# Patient Record
Sex: MALE | Race: WHITE | Employment: FULL TIME | URBAN - METROPOLITAN AREA
[De-identification: names, ages, dates, MRNs, and addresses within clinical notes are randomized per-mention and may not be internally consistent; named-entity substitution may affect disease eponyms.]

---

## 2020-02-22 ENCOUNTER — HOSPITAL ENCOUNTER (EMERGENCY)
Facility: CLINIC | Age: 30
Discharge: HOME | End: 2020-02-23
Attending: EMERGENCY MEDICINE | Admitting: EMERGENCY MEDICINE

## 2020-02-22 DIAGNOSIS — N39.0 URINARY TRACT INFECTION WITH HEMATURIA, SITE UNSPECIFIED: ICD-10-CM

## 2020-02-22 DIAGNOSIS — R36.9 PENILE DISCHARGE: ICD-10-CM

## 2020-02-22 DIAGNOSIS — R31.9 URINARY TRACT INFECTION WITH HEMATURIA, SITE UNSPECIFIED: ICD-10-CM

## 2020-02-22 LAB
ALBUMIN UR-MCNC: NEGATIVE MG/DL
APPEARANCE UR: CLEAR
BILIRUB UR QL STRIP: NEGATIVE
COLOR UR AUTO: YELLOW
GLUCOSE UR STRIP-MCNC: NEGATIVE MG/DL
HGB UR QL STRIP: ABNORMAL
KETONES UR STRIP-MCNC: NEGATIVE MG/DL
LEUKOCYTE ESTERASE UR QL STRIP: ABNORMAL
MUCOUS THREADS #/AREA URNS LPF: PRESENT /LPF
NITRATE UR QL: NEGATIVE
PH UR STRIP: 5.5 PH (ref 5–7)
RBC #/AREA URNS AUTO: 2 /HPF (ref 0–2)
SOURCE: ABNORMAL
SP GR UR STRIP: 1.02 (ref 1–1.03)
UROBILINOGEN UR STRIP-MCNC: NORMAL MG/DL (ref 0–2)
WBC #/AREA URNS AUTO: 22 /HPF (ref 0–5)

## 2020-02-22 PROCEDURE — 99283 EMERGENCY DEPT VISIT LOW MDM: CPT

## 2020-02-22 PROCEDURE — 87591 N.GONORRHOEAE DNA AMP PROB: CPT | Performed by: EMERGENCY MEDICINE

## 2020-02-22 PROCEDURE — 81001 URINALYSIS AUTO W/SCOPE: CPT | Performed by: EMERGENCY MEDICINE

## 2020-02-22 PROCEDURE — 87491 CHLMYD TRACH DNA AMP PROBE: CPT | Performed by: EMERGENCY MEDICINE

## 2020-02-22 RX ORDER — CEPHALEXIN 500 MG/1
500 CAPSULE ORAL 2 TIMES DAILY
Qty: 20 CAPSULE | Refills: 0 | Status: SHIPPED | OUTPATIENT
Start: 2020-02-22 | End: 2020-03-03

## 2020-02-22 ASSESSMENT — ENCOUNTER SYMPTOMS
HEMATURIA: 1
DYSURIA: 1
BACK PAIN: 1
NAUSEA: 0
FEVER: 0
VOMITING: 0

## 2020-02-22 NOTE — ED AVS SNAPSHOT
Emergency Department  [address] [place] [id number]  Phone:  [phone]  Fax:  [phone]                                    Leland Pat   MRN: [id number]    Department:   Emergency Department   Date of Visit:  2/22/2020           After Visit Summary Signature Page    I have received my discharge instructions, and my questions have been answered. I have discussed any challenges I see with this plan with the nurse or doctor.    ..........................................................................................................................................  Patient/Patient Representative Signature      ..........................................................................................................................................  Patient Representative Print Name and Relationship to Patient    ..................................................               ................................................  Date                                   Time    ..........................................................................................................................................  Reviewed by Signature/Title    ...................................................              ..............................................  Date                                               Time          22EPIC Rev 08/18

## 2020-02-23 VITALS
OXYGEN SATURATION: 99 % | DIASTOLIC BLOOD PRESSURE: 79 MMHG | RESPIRATION RATE: 18 BRPM | TEMPERATURE: 98.1 F | SYSTOLIC BLOOD PRESSURE: 131 MMHG

## 2020-02-23 NOTE — ED PROVIDER NOTES
History     Chief Complaint:  Hematuria    History limited due to language barrier. History translated via online  service.    [facility]   Leland Pat is a 29 year old male who presents to the emergency department today for evaluation of hematuria. The patient reports the development of hematuria, dysuria, and a mid low back pain this morning. He endorses a history of similar episodes x4. The patient explains that he recently started having intercourse with a new girlfriend and has been noticing a white discharge from his penis resulting in itching and a burning pain prior to intercourse for the last two weeks. The patient denies nausea, vomiting, fever, and history of STIs.     Allergies:  No Known Drug Allergies    Medications:    Medications reviewed. No current medications.     Past Medical History:    Medical history reviewed. No pertinent medical history.  Denies history of STIs    Past Surgical History:    Surgical history reviewed. No pertinent surgical history.    Family History:    Family history reviewed. No pertinent family history.     Social History:  The patient was accompanied to the ED by his friend.  Patient is Luxembourger speaking.      Review of Systems   Constitutional: Negative for fever.   Gastrointestinal: Negative for nausea and vomiting.   Genitourinary: Positive for dysuria and hematuria.   Musculoskeletal: Positive for back pain.   All other systems reviewed and are negative.    Physical Exam     Patient Vitals for the past 24 hrs:   BP Temp Temp src Heart Rate Resp SpO2   02/23/20 0012 131/79 98.1  F (36.7  C) -- -- 18 99 %   02/22/20 2225 129/83 97.6  F (36.4  C) Temporal 82 16 100 %     Physical Exam  General: Well-nourished, appears to be resting comfortably when I enter the room  Eyes: PERRL, conjunctivae pink no scleral icterus or conjunctival injection  ENT:  Moist mucus membranes, posterior oropharynx clear without erythema or exudates  Respiratory:  Lungs clear to  auscultation bilaterally, no crackles/rubs/wheezes.  Good air movement  CV: Normal rate and rhythm, no murmurs/rubs/gallops  GI:  Abdomen soft and non-distended.  Normoactive BS.  No tenderness, guarding or rebound  : Normal external exam, uncircumcised, no discharge at meatus, no candida. No sores or erosions  Skin: Warm, dry.  No rashes or petechiae  Musculoskeletal: No peripheral edema or calf tenderness  Neuro: Alert and oriented to person/place/time  Psychiatric: Normal affect      Emergency Department Course     Laboratory:  Laboratory findings were communicated with the patient who voiced understanding of the findings.    UA with Microscopic: Blood trace (A), Leukocyte Esterase moderate (A), WBC 22 (H), Mucous present (A), o/w WNL    Urine gonorrhea and chlamydia: Pending    Emergency Department Course:    2231 Nursing notes and vitals reviewed.    2249 I performed an exam of the patient as documented above.     Findings and plan explained to the patient. Patient discharged home with instructions regarding supportive care, medications, and reasons to return. The importance of close follow-up was reviewed. The patient was prescribed Keflex.    Impression & Plan      Medical Decision Making:  Leland Pat is a 29 year old male has symptoms consistent with a urinary tract infection.  Urinalysis confirms the infection.  There has been no fever, significant back/flank pain or significant abdominal pain.  There is no clinical evidence of pyelonephritis, appendicitis,  or diverticulitis.  The patient will be started on antibiotics for the infection. The patient is instructed to return if increasing pain, vomiting, fever, or inability to tolerate the oral antibiotic.  Follow up with primary physician is indicated if not improving in 2-3 days. GCC PCR is pending. The patient would like to await results prior to treatment for gonorrhea and/or chlamydia.  He is Sinhala-speaking only and will need a   for the follow-up call.  His mobile number is [phone].    Diagnosis:    ICD-10-CM    1. Urinary tract infection with hematuria, site unspecified N39.0     R31.9    2. Penile discharge R36.9      Disposition:   The patient is discharged to home.    Discharge Medications:  Discharge Medication List as of 2/23/2020 12:08 AM      START taking these medications    Details   cephALEXin (KEFLEX) 500 MG capsule Take 1 capsule (500 mg) by mouth 2 times daily for 10 days, Disp-20 capsule, R-0, Local Print           Scribe Disclosure:  Pam MCNEAL, am serving as a scribe at 10:49 PM on 2/22/2020 to document services personally performed by Dana Young MD based on my observations and the provider's statements to me.     EMERGENCY DEPARTMENT       Dana Young MD  02/23/20 0615

## 2020-02-23 NOTE — DISCHARGE INSTRUCTIONS
*You may resume diet and activities as tolerated.  *Take medications as prescribed.  Cephalexin as directed.  This will change the color of your urine to red.  Continue your current medications.  *Follow up with your doctor in the next 2-3 days for a recheck. I the gonorrhea and chlamydia test returns positive you will receive a follow-up call.  *Return if you develop fever, back pain, vomiting, unable to urinate, faint or feel like you will faint or become worse in any way.    Discharge Instructions  Urinary Tract Infection  You have urinary tract infection, or UTI. The urinary tract includes the kidneys (which make urine), ureters (the tubes that carry urine from the kidneys to the bladder), the bladder (which stores urine), and urethra (the tube that carries urine out of the bladder).  Urinary tract infections occur when bacteria travel up the urethra into the bladder. We suspect a UTI based on chemical and microscopic findings in your urine, but if there is a question about your findings, we will do a culture to see if bacteria grow. A urine culture takes several days. You should always follow-up with your primary physician to find out about results of your culture if one was done.   Return to the Emergency Department if:  You have severe back pain.  You are vomiting so that you can t take your medicine, or have signs of dehydration (such as urinating less than 3 times per day).  You have fever over 101.5 degrees F.  You have significant confusion or are very weak, or feel very ill.  Your child seems much more ill, won t wake up, won t respond right, or is crying for a long time and won t calm down.  Your child is showing signs of dehydration, Signs of dehydration can be:  Your infant has had no wet diapers in 4-5 hours.  Your older child has not passed urine in 6-8 hours.  Your infant or child starts to have dry mouth and lips, or no saliva or tears.    Follow-up with your doctor:   Children under 24 months  need to be seen by their regular doctor within one week after a diagnosis of a UTI. It may be necessary to do some imaging tests to look at the child s kidney or bladder.  You should begin to feel better within 24 - 48 hours of starting your antibiotic.  If you do not, you need to be seen again.      Treatment:   You will be treated with an antibiotic to kill the bacteria. We have to make an educated guess as to which antibiotic will work for your infection. In most healthy people, we can guess right almost all of the time. Sometimes a culture is done to show which antibiotics will work. This usually takes 2-3 days. When the culture is done, we may have to contact you to put you on a different antibiotic.  Take a pain medication such as Tylenol  (acetaminophen), Advil  (ibuprofen), Nuprin  (ibuprofen), or Aleve  (naproxen). If you have been given a narcotic such as Vicodin  (hydrocodone with acetaminophen), Percocet  (oxycodone with acetaminophen), or codeine, do not drive for four hours after you have taken it. If the narcotic contains Tylenol  (acetaminophen), do not take Tylenol  with it. All narcotics will cause constipation, so eat a high fiber diet.    Pyridium  (phenazopyridine) or Uristat  (phenazopyridine) is a prescription medication that numbs the bladder to reduce the burning pain of some UTIs.  The same medication is available in a non-prescription version called Azo-Standard  (phenazopyridine), Urodol  (phenazopyridine), or other brand names. This medication will change the color of the urine and tears (usually blue or orange). If you wear contacts, do not wear them while taking this medication as they may be stained by the medication.    Antibiotic Warning:   If you have been placed on antibiotics - watch for signs of allergic reaction.  These include rash, lip swelling, difficulty breathing, wheezing, and dizziness.  If you develop any of these symptoms, stop the antibiotic immediately and go to an  "emergency room or urgent care for evaluation.    Probiotics: If you have been given an antibiotic, you may want to also take a probiotic pill or eat yogurt with live cultures. Probiotics have \"good bacteria\" to help your intestines stay healthy. Studies have shown that probiotics help prevent diarrhea and other intestine problems (including C. diff infection) when you take antibiotics. You can buy these without a prescription in the pharmacy section of the store.   If you were given a prescription for medicine here today, be sure to read all of the information (including the package insert) that comes with your prescription.  This will include important information about the medicine, its side effects, and any warnings that you need to know about.  The pharmacist who fills the prescription can provide more information and answer questions you may have about the medicine.  If you have questions or concerns that the pharmacist cannot address, please call or return to the Emergency Department.   Opioid Medication Information    Pain medications are among the most commonly prescribed medicines, so we are including this information for all our patients. If you did not receive pain medication or get a prescription for pain medicine, you can ignore it.     You may have been given a prescription for an opioid (narcotic) pain medicine and/or have received a pain medicine while here in the Emergency Department. These medicines can make you drowsy or impaired. You must not drive, operate dangerous equipment, or engage in any other dangerous activities while taking these medications. If you drive while taking these medications, you could be arrested for DUI, or driving under the influence. Do not drink any alcohol while you are taking these medications.     Opioid pain medications can cause addiction. If you have a history of chemical dependency of any type, you are at a higher risk of becoming addicted to pain medications.  " Only take these prescribed medications to treat your pain when all other options have been tried. Take it for as short a time and as few doses as possible. Store your pain pills in a secure place, as they are frequently stolen and provide a dangerous opportunity for children or visitors in your house to start abusing these powerful medications. We will not replace any lost or stolen medicine.  As soon as your pain is better, you should flush all your remaining medication.     Many prescription pain medications contain Tylenol  (acetaminophen), including Vicodin , Tylenol #3 , Norco , Lortab , and Percocet .  You should not take any extra pills of Tylenol  if you are using these prescription medications or you can get very sick.  Do not ever take more than 3000 mg of acetaminophen in any 24 hour period.    All opioids tend to cause constipation. Drink plenty of water and eat foods that have a lot of fiber, such as fruits, vegetables, prune juice, apple juice and high fiber cereal.  Take a laxative if you don t move your bowels at least every other day. Miralax , Milk of Magnesia, Colace , or Senna  can be used to keep you regular.      Remember that you can always come back to the Emergency Department if you are not able to see your regular doctor in the amount of time listed above, if you get any new symptoms, or if there is anything that worries you.

## 2020-02-24 ENCOUNTER — TELEPHONE (OUTPATIENT)
Dept: EMERGENCY MEDICINE | Facility: CLINIC | Age: 30
End: 2020-02-24

## 2020-02-24 DIAGNOSIS — A74.9 CHLAMYDIA INFECTION: ICD-10-CM

## 2020-02-24 DIAGNOSIS — Z91.89 AT RISK FOR NAUSEA: ICD-10-CM

## 2020-02-24 LAB
C TRACH DNA SPEC QL NAA+PROBE: POSITIVE
N GONORRHOEA DNA SPEC QL NAA+PROBE: NEGATIVE
SPECIMEN SOURCE: ABNORMAL
SPECIMEN SOURCE: NORMAL

## 2020-02-24 RX ORDER — ONDANSETRON 4 MG/1
4 TABLET, ORALLY DISINTEGRATING ORAL ONCE
Qty: 1 TABLET | Refills: 0 | Status: SHIPPED | OUTPATIENT
Start: 2020-02-24 | End: 2020-02-24

## 2020-02-24 RX ORDER — AZITHROMYCIN 500 MG/1
1000 TABLET, FILM COATED ORAL ONCE
Qty: 2 TABLET | Refills: 0 | Status: SHIPPED | OUTPATIENT
Start: 2020-02-24 | End: 2020-02-24

## 2020-02-24 NOTE — RESULT ENCOUNTER NOTE
Final N. Gonorrhoeae PCR is NEGATIVE.   No treatment or change in treatment per [place] ED Lab Result protocol.

## 2020-02-24 NOTE — TELEPHONE ENCOUNTER
[facility] Emergency Department Lab result notification [Adult-Male]    [place] ED lab result protocol used  Chlamydia protocol    Reason for call  Notify of lab results, assess symptoms,  review ED providers recommendations/discharge instructions (if necessary) and advise per ED lab result f/u protocol    Lab Result (including Rx patient on, if applicable)  Final Chlamydia trachomatis PCR on 2/24/20 is POSITIVE for C. trachomatis rRNA by transcription mediated amplification.  Patient was treated appropriately in the ED [Yes or No]:   No         [place] ED discharge antibiotic (if prescribed): Cephalexin  If no treatment initiated in the [place] ED, treat per [place] ED Lab Result protocol.    Information table from ED Provider visit on 2/22/2020-2/23/2020  Symptoms reported at ED visit (Chief complaint, HPI) Hematuria     History limited due to language barrier. History translated via online  service.     HPI   Leland Pat is a 29 year old male who presents to the emergency department today for evaluation of hematuria. The patient reports the development of hematuria, dysuria, and a mid low back pain this morning. He endorses a history of similar episodes x4. The patient explains that he recently started having intercourse with a new girlfriend and has been noticing a white discharge from his penis resulting in itching and a burning pain prior to intercourse for the last two weeks. The patient denies nausea, vomiting, fever, and history of STIs.       Significant Medical hx, if applicable (i.e. CKD, diabetes) None   Allergies No Known Allergies   Weight, if applicable Wt Readings from Last 2 Encounters:   No data found for Wt      Coumadin/Warfarin [Yes /No] No   Creatinine Level (mg/dl) No results found for: CR   Creatinine clearance (ml/min), if applicable Creatinine clearance cannot be calculated (No successful lab value found.)   ED providers Impression and Plan  "(applicable information) Leland Pat is a 29 year old male has symptoms consistent with a urinary tract infection.  Urinalysis confirms the infection.  There has been no fever, significant back/flank pain or significant abdominal pain.  There is no clinical evidence of pyelonephritis, appendicitis,  or diverticulitis.  The patient will be started on antibiotics for the infection. The patient is instructed to return if increasing pain, vomiting, fever, or inability to tolerate the oral antibiotic.  Follow up with primary physician is indicated if not improving in 2-3 days. [facility] PCR is pending. The patient would like to await results prior to treatment for gonorrhea and/or chlamydia.  He is Uzbek-speaking only and will need a  for the follow-up call.  His mobile number is [phone].   ED diagnosis 1. Urinary tract infection with hematuria, site unspecified N39.0       R31.9     2. Penile discharge        ED provider Dana Young MD      RN Assessment (Patient s current Symptoms), include time called.  [Insert Left message here if message left]  2:45PM: Spoke with patient With assistance of  #[id number]. He states he is doing better. Still having some \"slight pain in his privates.\" No blood noted in the urine.     RN Recommendations/Instructions per [place] ED lab result protocol  Patient notified of lab result and treatment recommendations.  Rx for Azithromycin 1000 mg PO x 1 AND ondansetron (ZOFRAN ODT) 4 MG ODT tab x1 sent to [Pharmacy - [facility] in [place] on [address]].  RN reviewed information about Chlamydia from protocol/patient information and RN reference guide.  STD Patient Instructions:    We recommend that you contact any recent sexual partners within the last 2 months and have them evaluated by a physician.    Avoid sexual activity for 7 to 10 days or until both your and your partner(s) have completed all antibiotic medications.    We advise that you consider " following up with your PCP at approximately 3 months for retesting to be sure the infection has cleared.    Advised to follow up with the PCP as directed by the ED provider.  The patient is comfortable with the information given and has no further questions.      Please Contact your PCP clinic or return to the Emergency department if your:    Symptoms worsen or other concerning symptom's.    PCP follow-up Questions asked: YES       [RN Name]  Annabella Romero RN  [facility] RN  Lung Nodule and ED Lab Result RN  Epic pool (ED late result f/u RN): [id number]  [facility] INCIDENTAL RADIOLOGY F/U NURSES: [id number]  # [phone]      Copy of Lab result

## 2020-02-24 NOTE — RESULT ENCOUNTER NOTE
Final Chlamydia trachomatis PCR on 2/24/20 is POSITIVE for C. trachomatis rRNA by transcription mediated amplification.  Patient was treated appropriately in the ED [Yes or No]:   No         [place] ED discharge antibiotic (if prescribed): Cephalexin  If no treatment initiated in the [place] ED, treat per [place] ED Lab Result protocol.